# Patient Record
Sex: FEMALE | Race: BLACK OR AFRICAN AMERICAN | NOT HISPANIC OR LATINO | URBAN - METROPOLITAN AREA
[De-identification: names, ages, dates, MRNs, and addresses within clinical notes are randomized per-mention and may not be internally consistent; named-entity substitution may affect disease eponyms.]

---

## 2017-03-29 ENCOUNTER — EMERGENCY (EMERGENCY)
Facility: HOSPITAL | Age: 22
LOS: 1 days | Discharge: HOME | End: 2017-03-29
Attending: EMERGENCY MEDICINE | Admitting: EMERGENCY MEDICINE
Payer: MEDICAID

## 2017-03-29 VITALS
TEMPERATURE: 97 F | DIASTOLIC BLOOD PRESSURE: 83 MMHG | HEART RATE: 104 BPM | OXYGEN SATURATION: 99 % | SYSTOLIC BLOOD PRESSURE: 133 MMHG | RESPIRATION RATE: 16 BRPM

## 2017-03-29 DIAGNOSIS — M25.561 PAIN IN RIGHT KNEE: ICD-10-CM

## 2017-03-29 DIAGNOSIS — N76.0 ACUTE VAGINITIS: ICD-10-CM

## 2017-03-29 PROCEDURE — 99284 EMERGENCY DEPT VISIT MOD MDM: CPT

## 2017-03-29 PROCEDURE — 93971 EXTREMITY STUDY: CPT | Mod: 26,RT

## 2017-03-29 RX ORDER — AMOXICILLIN 250 MG/5ML
1 SUSPENSION, RECONSTITUTED, ORAL (ML) ORAL
Qty: 28 | Refills: 0 | OUTPATIENT
Start: 2017-03-29 | End: 2017-04-12

## 2017-03-29 RX ORDER — IBUPROFEN 200 MG
1 TABLET ORAL
Qty: 21 | Refills: 0 | OUTPATIENT
Start: 2017-03-29 | End: 2017-04-05

## 2017-03-29 RX ORDER — CEFTRIAXONE 500 MG/1
250 INJECTION, POWDER, FOR SOLUTION INTRAMUSCULAR; INTRAVENOUS ONCE
Qty: 0 | Refills: 0 | Status: COMPLETED | OUTPATIENT
Start: 2017-03-29 | End: 2017-03-29

## 2017-03-29 RX ORDER — AZITHROMYCIN 500 MG/1
1000 TABLET, FILM COATED ORAL ONCE
Qty: 0 | Refills: 0 | Status: COMPLETED | OUTPATIENT
Start: 2017-03-29 | End: 2017-03-29

## 2017-03-29 RX ORDER — FLUCONAZOLE 150 MG/1
150 TABLET ORAL ONCE
Qty: 0 | Refills: 0 | Status: COMPLETED | OUTPATIENT
Start: 2017-03-29 | End: 2017-03-29

## 2017-03-29 RX ORDER — IBUPROFEN 200 MG
600 TABLET ORAL ONCE
Qty: 0 | Refills: 0 | Status: COMPLETED | OUTPATIENT
Start: 2017-03-29 | End: 2017-03-29

## 2017-03-29 RX ADMIN — Medication 600 MILLIGRAM(S): at 16:22

## 2017-03-29 RX ADMIN — CEFTRIAXONE 250 MILLIGRAM(S): 500 INJECTION, POWDER, FOR SOLUTION INTRAMUSCULAR; INTRAVENOUS at 16:22

## 2017-03-29 RX ADMIN — AZITHROMYCIN 1000 MILLIGRAM(S): 500 TABLET, FILM COATED ORAL at 16:22

## 2017-03-29 RX ADMIN — FLUCONAZOLE 150 MILLIGRAM(S): 150 TABLET ORAL at 16:36

## 2017-03-29 NOTE — ED ADULT TRIAGE NOTE - CHIEF COMPLAINT QUOTE
Pt with right knee pain denies any recent injury, chest discomfort with sore throat, and vaginal itching and burning. Pt requesting std check up.

## 2017-03-29 NOTE — ED PROVIDER NOTE - MUSCULOSKELETAL, MLM
Spine appears normal, range of motion is not limited, no muscle or joint tenderness, no right calf swelling, pulses equal bilaterally, no joint swelling,

## 2017-03-29 NOTE — ED PROVIDER NOTE - NS ED MD SCRIBE ATTENDING SCRIBE SECTIONS
HISTORY OF PRESENT ILLNESS/PHYSICAL EXAM/VITAL SIGNS( Pullset)/PAST MEDICAL/SURGICAL/SOCIAL HISTORY/REVIEW OF SYSTEMS/DISPOSITION/HIV

## 2017-03-29 NOTE — ED PROVIDER NOTE - MEDICAL DECISION MAKING DETAILS
Will US duplex right lower extremity given family history of DVT. Gonorrhea and chlamydia nucleic amplification ordered. Had extensive discussion on abstinence on sexual intercourse until results are back and pt finishes entire course of treatment.   The scribe's documentation has been prepared under my direction and personally reviewed by me in its entirety. I confirm that the note above accurately reflects all work, treatment, procedures, and medical decision making performed by me. - SEAMUS Webb Will US duplex right lower extremity given family history of DVT. Gonorrhea and chlamydia nucleic amplification ordered. Had extensive discussion on abstinence on sexual intercourse until results are back and pt finishes entire course of treatment. will treat for vaginitis, cx sent; pt also advised pmd f/u if ongoing leg pain for repeat evaluation  The scribe's documentation has been prepared under my direction and personally reviewed by me in its entirety. I confirm that the note above accurately reflects all work, treatment, procedures, and medical decision making performed by me. - SEAMUS Webb

## 2017-03-29 NOTE — ED PROVIDER NOTE - ENMT, MLM
Airway patent, left lower posterior molar broken: no abscess, no induration, slight erythema and swelling noted to the gum.

## 2017-03-29 NOTE — ED PROVIDER NOTE - SKIN, MLM
Skin normal color for race, warm, dry and intact. No evidence of rash. Right lower extremity: no erythema,

## 2017-03-29 NOTE — ED PROVIDER NOTE - OBJECTIVE STATEMENT
20 yo F with a hx of gonorrhea, yeast infection currently on iron pills daily presents with multiple medical complaints today. States that she has had a toothache for the past week. Pain radiates to the throat and left eye. Pt states that she does not currently have a dentist in [facility]. Also c/o right anterior knee pain for the past few days. No CP, SOB, calf swelling, or posterior knee pain. States that her doctor usually checks for blood clots due to family history of DVT. Pt states that she has never had DVT. Not on any birth control. LNMP was last month which was regular. Last US on the legs was done a few months ago which was negative. Denies groin pain. Pt is also requesting STD check. Pt states that she has vaginal itching, and dysuria for the past 4 days. Last sexual intercourse was a month ago. Notes that she was treated for gonorrhea and yeast infection her last ED visit. Has GYN follow up in [facility] and last saw GYN 4 months ago.

## 2017-03-30 LAB
C TRACH RRNA SPEC QL NAA+PROBE: DETECTED
N GONORRHOEA RRNA SPEC QL NAA+PROBE: SIGNIFICANT CHANGE UP
SPECIMEN SOURCE: SIGNIFICANT CHANGE UP

## 2017-07-08 ENCOUNTER — EMERGENCY (EMERGENCY)
Facility: HOSPITAL | Age: 22
LOS: 1 days | Discharge: HOME | End: 2017-07-08
Admitting: EMERGENCY MEDICINE
Payer: COMMERCIAL

## 2017-07-08 VITALS
OXYGEN SATURATION: 100 % | WEIGHT: 143.08 LBS | SYSTOLIC BLOOD PRESSURE: 121 MMHG | TEMPERATURE: 98 F | DIASTOLIC BLOOD PRESSURE: 90 MMHG | HEIGHT: 66 IN | RESPIRATION RATE: 16 BRPM | HEART RATE: 86 BPM

## 2017-07-08 DIAGNOSIS — K08.89 OTHER SPECIFIED DISORDERS OF TEETH AND SUPPORTING STRUCTURES: ICD-10-CM

## 2017-07-08 PROCEDURE — 99283 EMERGENCY DEPT VISIT LOW MDM: CPT

## 2017-07-08 RX ORDER — OXYCODONE AND ACETAMINOPHEN 5; 325 MG/1; MG/1
1 TABLET ORAL ONCE
Qty: 0 | Refills: 0 | Status: DISCONTINUED | OUTPATIENT
Start: 2017-07-08 | End: 2017-07-08

## 2017-07-08 RX ORDER — IBUPROFEN 200 MG
600 TABLET ORAL ONCE
Qty: 0 | Refills: 0 | Status: COMPLETED | OUTPATIENT
Start: 2017-07-08 | End: 2017-07-08

## 2017-07-08 RX ORDER — IBUPROFEN 200 MG
1 TABLET ORAL
Qty: 12 | Refills: 0 | OUTPATIENT
Start: 2017-07-08 | End: 2017-07-12

## 2017-07-08 RX ADMIN — OXYCODONE AND ACETAMINOPHEN 1 TABLET(S): 5; 325 TABLET ORAL at 11:45

## 2017-07-08 RX ADMIN — Medication 600 MILLIGRAM(S): at 11:25

## 2017-07-08 NOTE — ED PROVIDER NOTE - OBJECTIVE STATEMENT
TB 23 yo otherwise healthy female presents to ed for b/l lower tooth pain for the past few weeks.  Pt had wisdom tooth extraction to left lower molar area 2 weeks ago and is scheduled to have rt lower molar wisdom tooth removed next week with Dr. tran.  Pt finished a course of amoxicillin last week and rx of hydrocodone.  pt tried tylenol with minimal alleviation of symptoms tried advil a few days ago but does not remember how much.  Pt reports lmp 2 weeks ago and no chance of being pregnant.  pt also reports intermittent rt sided throbbing headache and some throat discomfort for past few days.  pt otherwise denies fevers, chills, difficulty swallowing, sob, worst headache of her life, changes in vision, nausea, vomiting

## 2017-07-08 NOTE — ED PROVIDER NOTE - ENMT, MLM
Airway patent, Nasal mucosa clear. Mouth with normal mucosa. Throat has no vesicles, no oropharyngeal exudates and uvula is midline, no visible abscesses or swelling to gums b/l upper and lower,  no palpable fluctuance or expressible discharge

## 2017-07-08 NOTE — ED PROVIDER NOTE - MEDICAL DECISION MAKING DETAILS
TB 21 yo otherwise healthy female presents to ed for b/l lower tooth pain for the past few weeks, pt scheduled to have tooth extraction next week.  Pt also with headache and throat pain.  On exam no sign of dental abscess or infection, throat clear and neuro exam normal.  Pt likely with viral pharyngitis/throat irritation and tension headache.  Will give motrin and reassess. Pt has good dental followup, has appointment next week TB 21 yo otherwise healthy female presents to ed for b/l lower tooth pain for the past few weeks, pt scheduled to have tooth extraction next week.  Pt also with headache and throat pain.  On exam no sign of dental abscess or infection, throat clear and neuro exam normal.  Pt likely with viral pharyngitis/throat irritation and tension headache.  Will give motrin and reassess. Pt has good dental followup, has appointment next week.  Pt reassessed and reports feeling improved, pt stable for discharge

## 2017-07-08 NOTE — ED ADULT TRIAGE NOTE - CHIEF COMPLAINT QUOTE
Pt c/o bilateral lower tooth pain, states L lower tooth was extracted last Friday. Also c/o throat pain and headache.

## 2017-07-08 NOTE — ED ADULT NURSE NOTE - OBJECTIVE STATEMENT
Ot presented to ED with bilateral lower back teeth pain, stating "my left one was extracted last Friday."  No drooling, no active bleeding, no stridor noted. Pt also C/O sore throat and headache. Pt is able to speak coherently in full sentences, respiration unlabored and even, skin warm and non-diaphoretic, pt is afebrile, NAD noted.

## 2017-10-06 ENCOUNTER — EMERGENCY (EMERGENCY)
Facility: HOSPITAL | Age: 22
LOS: 1 days | Discharge: HOME | End: 2017-10-06
Attending: EMERGENCY MEDICINE | Admitting: EMERGENCY MEDICINE
Payer: MEDICAID

## 2017-10-06 VITALS
WEIGHT: 149.91 LBS | HEART RATE: 83 BPM | RESPIRATION RATE: 18 BRPM | DIASTOLIC BLOOD PRESSURE: 79 MMHG | OXYGEN SATURATION: 100 % | TEMPERATURE: 97 F | SYSTOLIC BLOOD PRESSURE: 120 MMHG

## 2017-10-06 DIAGNOSIS — N89.8 OTHER SPECIFIED NONINFLAMMATORY DISORDERS OF VAGINA: ICD-10-CM

## 2017-10-06 DIAGNOSIS — Z79.1 LONG TERM (CURRENT) USE OF NON-STEROIDAL ANTI-INFLAMMATORIES (NSAID): ICD-10-CM

## 2017-10-06 DIAGNOSIS — Z79.891 LONG TERM (CURRENT) USE OF OPIATE ANALGESIC: ICD-10-CM

## 2017-10-06 DIAGNOSIS — R10.2 PELVIC AND PERINEAL PAIN: ICD-10-CM

## 2017-10-06 LAB
ALBUMIN SERPL ELPH-MCNC: 4 G/DL — SIGNIFICANT CHANGE UP (ref 3.4–5)
ALP SERPL-CCNC: 51 U/L — SIGNIFICANT CHANGE UP (ref 40–120)
ALT FLD-CCNC: 14 U/L — SIGNIFICANT CHANGE UP (ref 12–42)
ANION GAP SERPL CALC-SCNC: 6 MMOL/L — LOW (ref 9–16)
APPEARANCE UR: CLEAR — SIGNIFICANT CHANGE UP
AST SERPL-CCNC: 23 U/L — SIGNIFICANT CHANGE UP (ref 15–37)
BASOPHILS NFR BLD AUTO: 1 % — SIGNIFICANT CHANGE UP (ref 0–2)
BILIRUB SERPL-MCNC: 0.2 MG/DL — SIGNIFICANT CHANGE UP (ref 0.2–1.2)
BILIRUB UR-MCNC: NEGATIVE — SIGNIFICANT CHANGE UP
BUN SERPL-MCNC: 14 MG/DL — SIGNIFICANT CHANGE UP (ref 7–23)
CALCIUM SERPL-MCNC: 9.1 MG/DL — SIGNIFICANT CHANGE UP (ref 8.5–10.5)
CHLORIDE SERPL-SCNC: 105 MMOL/L — SIGNIFICANT CHANGE UP (ref 96–108)
CO2 SERPL-SCNC: 31 MMOL/L — SIGNIFICANT CHANGE UP (ref 22–31)
COLOR SPEC: YELLOW — SIGNIFICANT CHANGE UP
CREAT SERPL-MCNC: 0.87 MG/DL — SIGNIFICANT CHANGE UP (ref 0.5–1.3)
DIFF PNL FLD: NEGATIVE — SIGNIFICANT CHANGE UP
EOSINOPHIL NFR BLD AUTO: 1.3 % — SIGNIFICANT CHANGE UP (ref 0–6)
GLUCOSE SERPL-MCNC: 67 MG/DL — LOW (ref 70–99)
GLUCOSE UR QL: NEGATIVE — SIGNIFICANT CHANGE UP
HCG UR QL: NEGATIVE — SIGNIFICANT CHANGE UP
HCT VFR BLD CALC: 30.1 % — LOW (ref 34.5–45)
HGB BLD-MCNC: 8.9 G/DL — LOW (ref 11.5–15.5)
HIV 1 & 2 AB SERPL IA.RAPID: SIGNIFICANT CHANGE UP
IMM GRANULOCYTES NFR BLD AUTO: 0.3 % — SIGNIFICANT CHANGE UP (ref 0–1.5)
KETONES UR-MCNC: NEGATIVE — SIGNIFICANT CHANGE UP
LEUKOCYTE ESTERASE UR-ACNC: NEGATIVE — SIGNIFICANT CHANGE UP
LYMPHOCYTES # BLD AUTO: 23.4 % — SIGNIFICANT CHANGE UP (ref 13–44)
MCHC RBC-ENTMCNC: 19.6 PG — LOW (ref 27–34)
MCHC RBC-ENTMCNC: 29.6 G/DL — LOW (ref 32–36)
MCV RBC AUTO: 66.4 FL — LOW (ref 80–100)
MONOCYTES NFR BLD AUTO: 10.5 % — SIGNIFICANT CHANGE UP (ref 2–14)
NEUTROPHILS NFR BLD AUTO: 63.5 % — SIGNIFICANT CHANGE UP (ref 43–77)
NITRITE UR-MCNC: NEGATIVE — SIGNIFICANT CHANGE UP
PH UR: 6 — SIGNIFICANT CHANGE UP (ref 5–8)
PLATELET # BLD AUTO: 352 K/UL — SIGNIFICANT CHANGE UP (ref 150–400)
POTASSIUM SERPL-MCNC: 3.5 MMOL/L — SIGNIFICANT CHANGE UP (ref 3.5–5.3)
POTASSIUM SERPL-SCNC: 3.5 MMOL/L — SIGNIFICANT CHANGE UP (ref 3.5–5.3)
PROT SERPL-MCNC: 7.9 G/DL — SIGNIFICANT CHANGE UP (ref 6.4–8.2)
PROT UR-MCNC: (no result) MG/DL
RBC # BLD: 4.53 M/UL — SIGNIFICANT CHANGE UP (ref 3.8–5.2)
RBC # FLD: 17.2 % — HIGH (ref 10.3–16.9)
SODIUM SERPL-SCNC: 142 MMOL/L — SIGNIFICANT CHANGE UP (ref 132–145)
SP GR SPEC: 1.02 — SIGNIFICANT CHANGE UP (ref 1–1.03)
T PALLIDUM AB TITR SER: NEGATIVE — SIGNIFICANT CHANGE UP
UROBILINOGEN FLD QL: 0.2 E.U./DL — SIGNIFICANT CHANGE UP
WBC # BLD: 3.8 K/UL — SIGNIFICANT CHANGE UP (ref 3.8–10.5)
WBC # FLD AUTO: 3.8 K/UL — SIGNIFICANT CHANGE UP (ref 3.8–10.5)

## 2017-10-06 PROCEDURE — 99285 EMERGENCY DEPT VISIT HI MDM: CPT

## 2017-10-06 PROCEDURE — 76856 US EXAM PELVIC COMPLETE: CPT | Mod: 26

## 2017-10-06 PROCEDURE — 76830 TRANSVAGINAL US NON-OB: CPT | Mod: 26

## 2017-10-06 RX ORDER — L.ACIDOPH/B.ANIMALIS/B.LONGUM 15B CELL
1 CAPSULE ORAL
Qty: 30 | Refills: 0 | OUTPATIENT
Start: 2017-10-06 | End: 2017-11-05

## 2017-10-06 RX ORDER — KETOROLAC TROMETHAMINE 30 MG/ML
60 SYRINGE (ML) INJECTION ONCE
Qty: 0 | Refills: 0 | Status: DISCONTINUED | OUTPATIENT
Start: 2017-10-06 | End: 2017-10-06

## 2017-10-06 RX ORDER — CEFTRIAXONE 500 MG/1
250 INJECTION, POWDER, FOR SOLUTION INTRAMUSCULAR; INTRAVENOUS ONCE
Qty: 0 | Refills: 0 | Status: COMPLETED | OUTPATIENT
Start: 2017-10-06 | End: 2017-10-06

## 2017-10-06 RX ADMIN — CEFTRIAXONE 250 MILLIGRAM(S): 500 INJECTION, POWDER, FOR SOLUTION INTRAMUSCULAR; INTRAVENOUS at 13:27

## 2017-10-06 RX ADMIN — Medication 100 MILLIGRAM(S): at 13:26

## 2017-10-06 NOTE — ED PROVIDER NOTE - PROGRESS NOTE DETAILS
Pt's labs wnl, sono suggest possible hemorrhagic cyst (clinical picture dn supoprt PID). She  reports begin in more pain and having move CMT 2 weeks ago. Will tx for poss PID but I suspect hat this is might be all a resolving hemorrhagic OV cyst. Vag d/c cough be physiologic

## 2017-10-06 NOTE — ED ADULT NURSE NOTE - OBJECTIVE STATEMENT
pt diagnosed with PID, did not finish medication. Reports having continued discharge and pelvic pain. Pain subsided at this time.

## 2017-10-06 NOTE — ED PROVIDER NOTE - OBJECTIVE STATEMENT
22y F presents to ED c/o intermittent pelvic pain, left side > right side. [facility] and diagnosed with PID on the basis of pelvic tenderness, but her STD panel was negative. Pt was prescribed with abx, but only took 1 dose because her abx later went missing. Pt is also here for persistent vaginal discharge. Denies fever, chills, any chance of pregnancy.

## 2017-10-06 NOTE — ED ADULT TRIAGE NOTE - CHIEF COMPLAINT QUOTE
here for lower abdominal pain (pelvic pain) with clear/white vaginal discharge- pt was dx with PID 2 weeks ago but did not complete her medication regimen- LMP 2.5 weeks ago

## 2023-12-12 NOTE — ED ADULT TRIAGE NOTE - HEIGHT IN FEET
Maren's first morning urine was negative for protein.  Other symptoms have improved.    Discussed orthostatic proteinuria.  Follow up if concerns or any worsening symptoms.    5